# Patient Record
Sex: MALE | Race: BLACK OR AFRICAN AMERICAN | NOT HISPANIC OR LATINO | Employment: FULL TIME | URBAN - METROPOLITAN AREA
[De-identification: names, ages, dates, MRNs, and addresses within clinical notes are randomized per-mention and may not be internally consistent; named-entity substitution may affect disease eponyms.]

---

## 2020-07-28 ENCOUNTER — LAB VISIT (OUTPATIENT)
Dept: LAB | Facility: HOSPITAL | Age: 30
End: 2020-07-28
Attending: INTERNAL MEDICINE
Payer: COMMERCIAL

## 2020-07-28 ENCOUNTER — OFFICE VISIT (OUTPATIENT)
Dept: GASTROENTEROLOGY | Facility: CLINIC | Age: 30
End: 2020-07-28
Payer: COMMERCIAL

## 2020-07-28 VITALS
BODY MASS INDEX: 22.32 KG/M2 | SYSTOLIC BLOOD PRESSURE: 121 MMHG | HEART RATE: 66 BPM | WEIGHT: 155.88 LBS | HEIGHT: 70 IN | DIASTOLIC BLOOD PRESSURE: 68 MMHG

## 2020-07-28 DIAGNOSIS — R10.33 PERIUMBILICAL ABDOMINAL PAIN: ICD-10-CM

## 2020-07-28 DIAGNOSIS — R11.0 NAUSEA: Primary | ICD-10-CM

## 2020-07-28 PROCEDURE — 3008F BODY MASS INDEX DOCD: CPT | Mod: CPTII,S$GLB,, | Performed by: INTERNAL MEDICINE

## 2020-07-28 PROCEDURE — 99204 OFFICE O/P NEW MOD 45 MIN: CPT | Mod: S$GLB,,, | Performed by: INTERNAL MEDICINE

## 2020-07-28 PROCEDURE — 99999 PR PBB SHADOW E&M-NEW PATIENT-LVL III: ICD-10-PCS | Mod: PBBFAC,,, | Performed by: INTERNAL MEDICINE

## 2020-07-28 PROCEDURE — 3008F PR BODY MASS INDEX (BMI) DOCUMENTED: ICD-10-PCS | Mod: CPTII,S$GLB,, | Performed by: INTERNAL MEDICINE

## 2020-07-28 PROCEDURE — 84110 ASSAY OF PORPHOBILINOGEN: CPT

## 2020-07-28 PROCEDURE — 99999 PR PBB SHADOW E&M-NEW PATIENT-LVL III: CPT | Mod: PBBFAC,,, | Performed by: INTERNAL MEDICINE

## 2020-07-28 PROCEDURE — 99204 PR OFFICE/OUTPT VISIT, NEW, LEVL IV, 45-59 MIN: ICD-10-PCS | Mod: S$GLB,,, | Performed by: INTERNAL MEDICINE

## 2020-07-28 RX ORDER — CYCLOBENZAPRINE HCL 10 MG
5-10 TABLET ORAL
COMMUNITY
Start: 2020-05-15

## 2020-07-28 RX ORDER — AMITRIPTYLINE HYDROCHLORIDE 75 MG/1
75 TABLET ORAL
COMMUNITY
Start: 2020-05-15

## 2020-07-28 NOTE — LETTER
July 28, 2020      Gomez Espana MD  [address] [place] [address] [address]  [facility] [address]           [facility] Gastroenter[facility]  [address]  [place] [address]  Phone: [phone]  Fax: [phone]          Patient: Iftikhar Obrien   MR Number: [id number]   YOB: 1990   Date of Visit: 7/28/2020       Dear Dr. Gomez Espana:    Thank you for referring Iftikhar Obrien to me for evaluation. Attached you will find relevant portions of my assessment and plan of care.    If you have questions, please do not hesitate to call me. I look forward to following Iftikhar Obrien along with you.    Sincerely,    Sudarshan Alfaro MD    Enclosure  CC:  No Recipients    If you would like to receive this communication electronically, please contact [email or web address] or [phone] to request more information on [employer] access.    For providers and/or their staff who would like to refer a patient to [facility], please contact us through our one-stop-shop provider referral line, [facility], at [phone].    If you feel you have received this communication in error or would no longer like to receive these types of communications, please e-mail [email or web address]

## 2020-07-28 NOTE — PROGRESS NOTES
Reason for visit:  Intermittent bouts of nausea vomiting and periumbilical abdominal pain    HPI:  Mr. Obrien is a 30-year-old gentleman referred by Dr. Micah Gómez at [facility] for chronic nausea and vomiting.  His symptoms go back for at least the past 10 years.  He describes intermittent bouts of intense nausea associated with vomiting which requires hospitalization.  During these episodes he will have heartburn and acid regurgitation.  During these episodes anti medics and acid reflux medications palliate his symptoms.  Once symptoms are resolved which she usually takes few weeks he goes back to normal with no further symptoms.  Currently he is symptom free with no dysphagia, odynophagia, nausea vomiting or abdominal pains.  His bowels are moving regular without any laxatives.  He has had bouts of constipation in the past which is controlled with true Prakash.  He currently smokes marijuana intermittently.  Prior investigations for his GI symptoms have included ultrasound, CT imaging, HIDA scan, endoscopic evaluations without any clear etiology.  He went underwent cholecystectomy for suspected chronic cholecystitis.  Currently his body weight is stable his appetite is normal denies any family history of GI malignancy or inflammatory bowel disease.  He states that at 1 point marijuana was about up as a possible culprit and he discontinued it for an extended year time but did not notice improvement in his symptoms.  Patient states his last use of marijuana was March of this year.    Past medical, surgical, social family history reviewed in epic    Medication allergies reviewed in epic    Review of systems:  Constitutional:  No fever, no chills, no weight loss, appetite is normal, complains of intermittent night sweats  Eyes:  No visual changes or red eyes  ENT:  No odynophagia or hoarseness of voice, no oral aphthous ulcers  Cardiovascular:  No angina or palpitation  Respiratory:  No shortness breath or  wheezing  Genitourinary:  No dysuria or frequency  Musculoskeletal:  No myalgias or arthralgias  Skin:  No pruritus or eczema  Neurologic:  Chronic headaches, no seizures  Psychiatric:  No anxiety or depression  Gastrointestinal:  See HPI    Physical exam:  Vitals see epic, awake, alert, oriented x3 in no acute distress  Neck:  Supple, no carotid bruit, no cervical adenopathy  Abdomen:  Flat, soft, nontender, nondistended, no masses palpable, no hepatosplenomegaly detected, bowel sounds are normal, no abdominal bruits heard  Eyes:  Conjunctivae anicteric, not injected  ENT:  Oral mucosa moist  Cardiovascular:  S1, S2 normal, no murmurs or gallops  Respiratory:  Bilateral air entry equal with no rhonchi crackles  Skin:  No palmar erythema or spider angiomata  Neurologic:  No asterixis or tremors  Psychiatric:  Affect appropriate  Lower extremities:  No pedal edema    Prior investigations at [facility] reviewed.  Previous CT of the abdomen, ultrasound, CT head, EGD, colonoscopy reviewed    Impression:  Intermittent bouts of nausea and vomiting-suggestive of cyclical vomiting with associated periumbilical abdominal pain and night sweats.    Recommendation:  1.  Discontinue marijuana  2.  Check blood drug screen, random cortisol, heavy metal screen, ESR, CMP, CBC, QuantiFERON gold TB, urine PBG  3.  Continue to use antiemetics and anti acid medications p.o. p.r.n..

## 2020-08-02 LAB
COLLECT DURATION TIME SPEC: NORMAL HR
CREAT 24H UR-MRATE: NORMAL MG/D (ref 1000–2500)
CREAT UR-MCNC: 164 MG/DL
PBG UR-SCNC: 6.6 UMOL/L (ref 0–8.8)
SPECIMEN VOL ?TM UR: NORMAL ML

## 2020-08-03 ENCOUNTER — TELEPHONE (OUTPATIENT)
Dept: GASTROENTEROLOGY | Facility: CLINIC | Age: 30
End: 2020-08-03

## 2020-08-03 NOTE — TELEPHONE ENCOUNTER
----- Message from Sudarshan Alfaro MD sent at 8/3/2020  8:47 AM CDT -----  Blood tests and urine test looks fine. Please have him follow up if he has another episode of vomiting.

## 2020-08-03 NOTE — TELEPHONE ENCOUNTER
Spoke with patient , results given as written by Dr. Alfaro  Pt verbalizes understadning and appreciates the call.  Thanks MA